# Patient Record
Sex: FEMALE | URBAN - METROPOLITAN AREA
[De-identification: names, ages, dates, MRNs, and addresses within clinical notes are randomized per-mention and may not be internally consistent; named-entity substitution may affect disease eponyms.]

---

## 2022-10-25 ENCOUNTER — OFFICE VISIT (OUTPATIENT)
Dept: URBAN - METROPOLITAN AREA CLINIC 17 | Facility: CLINIC | Age: 62
End: 2022-10-25
Payer: COMMERCIAL

## 2022-10-25 DIAGNOSIS — H25.13 AGE-RELATED NUCLEAR CATARACT, BILATERAL: ICD-10-CM

## 2022-10-25 DIAGNOSIS — L03.213 PRESEPTAL CELLULITIS: Primary | ICD-10-CM

## 2022-10-25 DIAGNOSIS — H00.024 HORDEOLUM INTERNUM LEFT UPPER EYELID: ICD-10-CM

## 2022-10-25 DIAGNOSIS — H11.153 PINGUECULA, BILATERAL: ICD-10-CM

## 2022-10-25 PROCEDURE — 99203 OFFICE O/P NEW LOW 30 MIN: CPT | Performed by: OPTOMETRIST

## 2022-10-25 RX ORDER — AZITHROMYCIN MONOHYDRATE 250 MG/1
250 MG TABLET, FILM COATED ORAL
Qty: 6 | Refills: 0 | Status: INACTIVE
Start: 2022-10-25 | End: 2022-10-29

## 2022-10-25 NOTE — IMPRESSION/PLAN
Impression: Preseptal cellulitis: X40.442. Plan: Discussed diagnosis in detail with patient. Start Azithromycin BID PO x1 day, then QD PO x4 days. Will continue to observe.

## 2022-10-25 NOTE — IMPRESSION/PLAN
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 Plan: Cataracts account for the patient's complaints. No treatment currently recommended. The patient will monitor vision changes and contact us with any decrease in vision.

## 2022-10-31 ENCOUNTER — OFFICE VISIT (OUTPATIENT)
Dept: URBAN - METROPOLITAN AREA CLINIC 17 | Facility: CLINIC | Age: 62
End: 2022-10-31
Payer: COMMERCIAL

## 2022-10-31 DIAGNOSIS — L03.213 PRESEPTAL CELLULITIS: Primary | ICD-10-CM

## 2022-10-31 DIAGNOSIS — H00.024 HORDEOLUM INTERNUM LEFT UPPER EYELID: ICD-10-CM

## 2022-10-31 PROCEDURE — 99213 OFFICE O/P EST LOW 20 MIN: CPT | Performed by: OPTOMETRIST

## 2022-10-31 ASSESSMENT — INTRAOCULAR PRESSURE
OD: 12
OS: 14

## 2022-10-31 NOTE — IMPRESSION/PLAN
Impression: Hordeolum internum left upper eyelid: H00.024. Plan: Possible small chalazion formazion UL. Recommend continuing warm compresses, if bothersome.

## 2022-10-31 NOTE — IMPRESSION/PLAN
Impression: Preseptal cellulitis: K20.280. Resolved Plan: Possible small chalazion UL. Condition has resolved.

## 2023-03-20 ENCOUNTER — OFFICE VISIT (OUTPATIENT)
Dept: URBAN - METROPOLITAN AREA CLINIC 17 | Facility: CLINIC | Age: 63
End: 2023-03-20
Payer: COMMERCIAL

## 2023-03-20 DIAGNOSIS — H00.025 HORDEOLUM INTERNUM LEFT LOWER EYELID: Primary | ICD-10-CM

## 2023-03-20 PROCEDURE — 99213 OFFICE O/P EST LOW 20 MIN: CPT | Performed by: OPTOMETRIST

## 2023-03-20 ASSESSMENT — INTRAOCULAR PRESSURE
OS: 11
OD: 7

## 2023-03-20 NOTE — IMPRESSION/PLAN
Impression: Hordeolum internum left lower eyelid: H00.025. Plan: Discussed diagnosis in detail with patient. Patient okay to d/c using Tobradex gtts, okay to continue Amoxicillin. Patient to continue warm/firm lid massages frequently to reduce swelling. If it does not improve, patient to schedule consult with Dr. Tawny Aj for possible I&D.